# Patient Record
(demographics unavailable — no encounter records)

---

## 2024-10-23 NOTE — HISTORY OF PRESENT ILLNESS
[___ Weeks Post Op] : [unfilled] weeks post op [1] : the patient reports pain that is 1/10 in severity [de-identified] : 09/03/2024 - right posterior knee mass resection [de-identified] : Patient is recovering well from his surgery. He is already back to lacrosse and is running and cutting without symptoms. He is participating in PT with good results. [de-identified] : On exam his incision is clean, dry, and intact.  He has ROM up to 140 degrees, maybe lacking 5 or 10 degrees compared to the other side. He has no paresthesias, and has excellent healing. 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 My suggestion is to get back to regular activity. We discussed that this is benign but does have a chance at recurrence. [de-identified] : Should he have other symptoms, he can come back for repeat imaging, otherwise I can see him again as needed.  If imaging or pathology/biopsy was ordered, the patient was told to make an appointment to review findings right after all imaging is completed.   We discussed risks, benefits and alternatives. Rationale of care was reviewed and all questions were answered. Patient (and family) had all questions answered to an agreeable level of satisfaction. Patient (and family) expressed understanding and interest in proceeding with the plan as outlined.

## 2024-10-23 NOTE — END OF VISIT
[FreeTextEntry3] : All medical record entries made by the Scribe were at my, Dr. Dany Barksdale, direction and personally dictated by me on 10/23/2024. I have reviewed the chart and agree that the record accurately reflects my personal performance of the history, physical exam, assessment and plan. I have also personally directed, reviewed, and agreed with the chart.

## 2024-10-23 NOTE — ADDENDUM
[FreeTextEntry1] : I, Eliecer Irby, documented this note as a scribe on behalf of Dr. Dany Barksdale on 10/23/2024.